# Patient Record
Sex: FEMALE | Race: WHITE | Employment: FULL TIME | URBAN - METROPOLITAN AREA
[De-identification: names, ages, dates, MRNs, and addresses within clinical notes are randomized per-mention and may not be internally consistent; named-entity substitution may affect disease eponyms.]

---

## 2023-10-22 ENCOUNTER — LAB REQUISITION (OUTPATIENT)
Dept: LAB | Facility: HOSPITAL | Age: 28
End: 2023-10-22
Payer: MEDICAID

## 2023-10-22 DIAGNOSIS — R39.9 UNSPECIFIED SYMPTOMS AND SIGNS INVOLVING THE GENITOURINARY SYSTEM: ICD-10-CM

## 2023-10-22 PROCEDURE — 87186 SC STD MICRODIL/AGAR DIL: CPT

## 2023-10-22 PROCEDURE — 87086 URINE CULTURE/COLONY COUNT: CPT

## 2023-10-24 LAB — BACTERIA UR CULT: ABNORMAL

## 2023-11-03 ENCOUNTER — LAB REQUISITION (OUTPATIENT)
Dept: LAB | Facility: HOSPITAL | Age: 28
End: 2023-11-03
Payer: MEDICAID

## 2023-11-03 ENCOUNTER — LAB REQUISITION (OUTPATIENT)
Dept: LAB | Facility: HOSPITAL | Age: 28
End: 2023-11-03

## 2023-11-03 DIAGNOSIS — R10.0 ACUTE ABDOMEN: ICD-10-CM

## 2023-11-03 PROCEDURE — 87661 TRICHOMONAS VAGINALIS AMPLIF: CPT

## 2023-11-03 PROCEDURE — 87086 URINE CULTURE/COLONY COUNT: CPT

## 2023-11-03 PROCEDURE — 87800 DETECT AGNT MULT DNA DIREC: CPT

## 2023-11-05 LAB
BACTERIA UR CULT: NO GROWTH
T VAGINALIS RRNA SPEC QL NAA+PROBE: NEGATIVE

## 2023-11-06 LAB
C TRACH RRNA SPEC QL NAA+PROBE: NEGATIVE
N GONORRHOEA DNA SPEC QL PROBE+SIG AMP: NEGATIVE

## 2024-04-10 ENCOUNTER — OFFICE VISIT (OUTPATIENT)
Dept: URGENT CARE | Facility: CLINIC | Age: 29
End: 2024-04-10
Payer: MEDICAID

## 2024-04-10 VITALS
SYSTOLIC BLOOD PRESSURE: 124 MMHG | DIASTOLIC BLOOD PRESSURE: 86 MMHG | BODY MASS INDEX: 16.2 KG/M2 | TEMPERATURE: 97.6 F | WEIGHT: 103.2 LBS | HEIGHT: 67 IN | OXYGEN SATURATION: 98 % | HEART RATE: 66 BPM

## 2024-04-10 DIAGNOSIS — H10.33 ACUTE CONJUNCTIVITIS OF BOTH EYES, UNSPECIFIED ACUTE CONJUNCTIVITIS TYPE: Primary | ICD-10-CM

## 2024-04-10 PROCEDURE — 99203 OFFICE O/P NEW LOW 30 MIN: CPT

## 2024-04-10 RX ORDER — POLYMYXIN B SULFATE AND TRIMETHOPRIM 1; 10000 MG/ML; [USP'U]/ML
1 SOLUTION OPHTHALMIC EVERY 4 HOURS
Qty: 10 ML | Refills: 0 | Status: SHIPPED | OUTPATIENT
Start: 2024-04-10 | End: 2024-04-20

## 2024-04-10 RX ORDER — DICYCLOMINE HYDROCHLORIDE 10 MG/1
10 CAPSULE ORAL EVERY 8 HOURS
COMMUNITY
Start: 2023-11-04

## 2024-04-10 ASSESSMENT — ENCOUNTER SYMPTOMS
PHOTOPHOBIA: 1
CONSTITUTIONAL NEGATIVE: 1
FATIGUE: 0
SORE THROAT: 0
ABDOMINAL PAIN: 0
CHILLS: 0
MYALGIAS: 0
VOMITING: 0
DIZZINESS: 0
DIAPHORESIS: 0
MUSCULOSKELETAL NEGATIVE: 1
GASTROINTESTINAL NEGATIVE: 1
RESPIRATORY NEGATIVE: 1
DIARRHEA: 0
EYE ITCHING: 0
PALPITATIONS: 0
LOSS OF CONSCIOUSNESS: 0
EYE DISCHARGE: 1
COUGH: 0
NAUSEA: 0
ARTHRALGIAS: 0
CARDIOVASCULAR NEGATIVE: 1
EYE REDNESS: 1
HEADACHES: 1
FEVER: 0
SHORTNESS OF BREATH: 0
RHINORRHEA: 1

## 2024-04-10 ASSESSMENT — VISUAL ACUITY: OU: 1

## 2024-04-10 NOTE — PATIENT INSTRUCTIONS
CONJUNCTIVITIS       - POLYMIXIN B- TRIMETHOPRIM eye drops have been prescribed for 5 days, may reduce from 4x daily to 2x daily after redness/irritation improves (usually after 2 days)       - OLOPATADINE eye drops may be used for allergic conjunctivitis (itchiness and irritation), this may also be purchased over the counter    - Advised to avoid contact with others until 24 hours after prescription started     - If seasonal allergies were a contributing factor to eye irritation, take antihistamine (Zyrtec/Xyzal/Claritin/Allegra) 1 pill once a day, preferably at night as may cause mild drowsiness    - Avoid rubbing the eyes. If itching is bothersome, use artificial tears, a cool compress, or antihistamine eye drops     - If you wear contact lenses, be sure to dispose of current lenses and start fresh after treatment, wear glasses while treating eye infection    - If you have been wearing eye makeup, be sure to dispose of any recently used products as they are contaminated    - Wash hands frequently and avoid sharing towels, bedsheets, or other personal products with others     - Follow-up with eye doctor ASAP if symptoms don't improve or get worse in the next 3 days, if you do not have an eye doctor you can try:         [address] [address] [phone] or          [facility] [place] [address] [phone]

## 2024-04-10 NOTE — PROGRESS NOTES
"Subjective     Krystal Matos is a 28 y.o. female who presents for Conjunctivitis.    Patient presents today with bilateral eye redness and discharge for the last 3 days. She reports believing it may be allergies, but she was around people who recently had pink eye over the weekend. Has been having some congestion and has tried allergy medication and cold compresses the last couple nights. Denies eye itchiness/pain or use of contact lenses      History provided by:  Patient and medical records  Conjunctivitis  Associated symptoms: congestion, headaches and rhinorrhea    Associated symptoms: no abdominal pain, no chest pain, no cough, no diarrhea, no ear pain, no fatigue, no fever, no loss of consciousness, no myalgias, no nausea, no rash, no shortness of breath, no sore throat and no vomiting        /86 (BP Location: Left arm, Patient Position: Sitting, BP Cuff Size: Child)   Pulse 66   Temp 36.4 °C (97.6 °F) (Oral)   Ht 1.702 m (5' 7\")   Wt 46.8 kg (103 lb 3.2 oz)   SpO2 98%   BMI 16.16 kg/m²    All vitals have been reviewed and are stable.    Review of Systems   Constitutional: Negative.  Negative for chills, diaphoresis, fatigue and fever.   HENT:  Positive for congestion and rhinorrhea. Negative for ear pain, sneezing and sore throat.    Eyes:  Positive for photophobia, discharge and redness. Negative for itching and visual disturbance.   Respiratory: Negative.  Negative for cough and shortness of breath.    Cardiovascular: Negative.  Negative for chest pain and palpitations.   Gastrointestinal: Negative.  Negative for abdominal pain, diarrhea, nausea and vomiting.   Musculoskeletal: Negative.  Negative for arthralgias and myalgias.   Skin: Negative.  Negative for rash.   Allergic/Immunologic: Positive for environmental allergies.   Neurological:  Positive for headaches. Negative for dizziness and loss of consciousness.       Objective   Physical Exam  Vitals and nursing note reviewed. "   Constitutional:       General: She is awake. She is not in acute distress.     Appearance: Normal appearance. She is well-developed.   HENT:      Head: Normocephalic and atraumatic.      Nose: Nose normal.      Mouth/Throat:      Lips: Pink.      Mouth: Mucous membranes are moist.      Pharynx: Oropharynx is clear.   Eyes:      General: Lids are normal. Lids are everted, no foreign bodies appreciated. Vision grossly intact. Gaze aligned appropriately. No allergic shiner.        Right eye: Discharge present.         Left eye: Discharge present.     Extraocular Movements: Extraocular movements intact.      Conjunctiva/sclera:      Right eye: Right conjunctiva is injected.      Left eye: Left conjunctiva is injected.      Pupils: Pupils are equal, round, and reactive to light.   Cardiovascular:      Rate and Rhythm: Normal rate and regular rhythm.   Pulmonary:      Effort: Pulmonary effort is normal. No respiratory distress.   Abdominal:      General: Abdomen is flat. There is no distension.   Musculoskeletal:         General: No swelling or deformity. Normal range of motion.      Cervical back: Normal range of motion.   Skin:     General: Skin is warm and dry.   Neurological:      General: No focal deficit present.      Mental Status: She is alert and oriented to person, place, and time. Mental status is at baseline.      Motor: Motor function is intact.      Coordination: Coordination is intact.   Psychiatric:         Mood and Affect: Mood and affect normal.         Speech: Speech normal.         Behavior: Behavior normal.         Thought Content: Thought content normal.         Judgment: Judgment normal.         Assessment/Plan   Problem List Items Addressed This Visit    None  Visit Diagnoses       Acute conjunctivitis of both eyes, unspecified acute conjunctivitis type    -  Primary    Relevant Medications    polymyxin B sulf-trimethoprim (Polytrim) ophthalmic solution            Red flags for reporting to ER  have been reviewed with the patient.    Current diagnosis, any medication changes, and all in-office lab or radiologic results have been reviewed with the patient at the time of the visit.   If symptoms do not improve or worsen, patient is to follow up with PCP or report to the emergency room.   Patient is alert and oriented x3 and non-toxic appearing. Vital signs are stable.   Patient and/or guardian has sufficient decision-making capabilities at this time and reports understanding and agreement with the treatment plan made through shared decision-making.

## 2024-06-07 ENCOUNTER — OFFICE VISIT (OUTPATIENT)
Dept: URGENT CARE | Facility: CLINIC | Age: 29
End: 2024-06-07
Payer: MEDICAID

## 2024-06-07 VITALS
OXYGEN SATURATION: 98 % | TEMPERATURE: 98.8 F | DIASTOLIC BLOOD PRESSURE: 74 MMHG | HEART RATE: 68 BPM | BODY MASS INDEX: 16 KG/M2 | HEIGHT: 68 IN | SYSTOLIC BLOOD PRESSURE: 116 MMHG | WEIGHT: 105.6 LBS

## 2024-06-07 DIAGNOSIS — B37.9 ANTIBIOTIC-INDUCED YEAST INFECTION: ICD-10-CM

## 2024-06-07 DIAGNOSIS — R82.998 LEUKOCYTES IN URINE: ICD-10-CM

## 2024-06-07 DIAGNOSIS — T36.95XA ANTIBIOTIC-INDUCED YEAST INFECTION: ICD-10-CM

## 2024-06-07 DIAGNOSIS — Z11.3 SCREENING FOR STDS (SEXUALLY TRANSMITTED DISEASES): ICD-10-CM

## 2024-06-07 DIAGNOSIS — N89.8 VAGINAL DISCHARGE: ICD-10-CM

## 2024-06-07 DIAGNOSIS — R30.0 DYSURIA: Primary | ICD-10-CM

## 2024-06-07 PROBLEM — R10.0 ACUTE ABDOMEN: Status: RESOLVED | Noted: 2023-11-03 | Resolved: 2024-06-07

## 2024-06-07 PROBLEM — H10.33 ACUTE CONJUNCTIVITIS OF BOTH EYES: Status: RESOLVED | Noted: 2024-06-07 | Resolved: 2024-06-07

## 2024-06-07 PROBLEM — S01.25XA: Status: RESOLVED | Noted: 2017-12-25 | Resolved: 2024-06-07
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
## 2024-06-07 PROBLEM — N39.0 URINARY TRACT INFECTION: Status: RESOLVED | Noted: 2023-10-22 | Resolved: 2024-06-07

## 2024-06-07 LAB
POC APPEARANCE, URINE: ABNORMAL
POC BILIRUBIN, URINE: NEGATIVE
POC BLOOD, URINE: ABNORMAL
POC COLOR, URINE: YELLOW
POC GLUCOSE, URINE: NEGATIVE MG/DL
POC KETONES, URINE: ABNORMAL MG/DL
POC LEUKOCYTES, URINE: ABNORMAL
POC NITRITE,URINE: NEGATIVE
POC PH, URINE: 6.5 PH
POC PROTEIN, URINE: NEGATIVE MG/DL
POC SPECIFIC GRAVITY, URINE: 1.02
POC UROBILINOGEN, URINE: 0.2 EU/DL

## 2024-06-07 PROCEDURE — 87086 URINE CULTURE/COLONY COUNT: CPT

## 2024-06-07 PROCEDURE — 1036F TOBACCO NON-USER: CPT

## 2024-06-07 PROCEDURE — 81003 URINALYSIS AUTO W/O SCOPE: CPT | Mod: CLIA WAIVED TEST

## 2024-06-07 PROCEDURE — 87591 N.GONORRHOEAE DNA AMP PROB: CPT

## 2024-06-07 PROCEDURE — 87661 TRICHOMONAS VAGINALIS AMPLIF: CPT

## 2024-06-07 PROCEDURE — 87491 CHLMYD TRACH DNA AMP PROBE: CPT

## 2024-06-07 PROCEDURE — 99214 OFFICE O/P EST MOD 30 MIN: CPT

## 2024-06-07 RX ORDER — L. ACIDOPHILUS/L.BULGARICUS 1MM CELL
1 TABLET ORAL
Qty: 30 TABLET | Refills: 0 | Status: SHIPPED | OUTPATIENT
Start: 2024-06-07 | End: 2024-07-07

## 2024-06-07 RX ORDER — CEPHALEXIN 500 MG/1
CAPSULE ORAL
COMMUNITY
Start: 2023-10-22

## 2024-06-07 RX ORDER — METRONIDAZOLE 500 MG/1
500 TABLET ORAL 2 TIMES DAILY
Qty: 14 TABLET | Refills: 0 | Status: SHIPPED | OUTPATIENT
Start: 2024-06-07 | End: 2024-06-14

## 2024-06-07 RX ORDER — SULFAMETHOXAZOLE AND TRIMETHOPRIM 800; 160 MG/1; MG/1
TABLET ORAL
COMMUNITY
Start: 2023-10-30

## 2024-06-07 RX ORDER — NITROFURANTOIN 25; 75 MG/1; MG/1
100 CAPSULE ORAL 2 TIMES DAILY
Qty: 14 CAPSULE | Refills: 0 | Status: SHIPPED | OUTPATIENT
Start: 2024-06-07 | End: 2024-06-14

## 2024-06-07 RX ORDER — FLUCONAZOLE 150 MG/1
TABLET ORAL
Qty: 2 TABLET | Refills: 0 | Status: SHIPPED | OUTPATIENT
Start: 2024-06-07

## 2024-06-07 ASSESSMENT — ENCOUNTER SYMPTOMS
CARDIOVASCULAR NEGATIVE: 1
CONSTITUTIONAL NEGATIVE: 1
VOMITING: 0
SHORTNESS OF BREATH: 0
ARTHRALGIAS: 0
DIARRHEA: 0
HEADACHES: 0
FLANK PAIN: 0
HEMATURIA: 0
CHILLS: 0
COUGH: 0
WEAKNESS: 0
NAUSEA: 0
GASTROINTESTINAL NEGATIVE: 1
DIZZINESS: 0
DYSURIA: 1
FATIGUE: 0
FEVER: 0
FREQUENCY: 1
DIAPHORESIS: 0
PALPITATIONS: 0
RESPIRATORY NEGATIVE: 1
NEUROLOGICAL NEGATIVE: 1
MYALGIAS: 0
MUSCULOSKELETAL NEGATIVE: 1
ABDOMINAL PAIN: 0

## 2024-06-07 NOTE — PATIENT INSTRUCTIONS
DYSURIA      - Urinalysis was performed in office with findings of leukocytes and trace blood, indicative of a UTI - NITROFURANTOIN x 7 days was prescribed for treatment of UTI     - Urine culture was sent for further analysis and bacterial sensitivity to antibiotics     - Phenazopyridine (Azo) may be used for no longer than 2 days to relieve symptoms like urinary frequency and burning -- this will temporarily stain urine dark orange-red   (it will also stain contact lens, so do not wear while taking)     - Drink plenty of water   - Cranberry juice (no added sugar- not cocktail), probiotics, and vitamin C have not been scientifically proven to be beneficial in UTIs, but will not cause harm   - Avoid sugary foods and beverages   - Proper hygiene is most important in preventing UTIs (women should always wipe front to back and urinate soon after intercourse)     - SEEK IMMEDIATE MEDICAL CARE IF: you experience fever, back pain, rash, nausea, vomiting, or confusion  If symptoms worsen or do not improve in 2-4 days, follow up with PCP, if you need a referral, please call our office.     VAGINITIS     - Testing was performed for trichomonas, gonorrhea, and chlamydia   - Results will be delivered by phone in 2-4 days   - METRONIDAZOLE x7 days was prescribed for treatment of suspected BV infection - may start this right away, or wait until further along in UTI treatment      - FLUCONAZOLE was prescribed to be taken if yeast infection symptoms develop after multiple antibiotics     - Any further treatments will be determined when results return     - Chemical irritants (soaps, lotions, powders, sprays, scented panty liners) and allergens (latex, seminal fluid, chemical preservatives) can increase local sensitivity and risk of infection   - If symptoms worsen or do not improve in 3-6 days, follow up with PCP, if you need a referral, please call our office.

## 2024-06-07 NOTE — PROGRESS NOTES
"Subjective   History  Krystal Matos is a 28 y.o. female who presents for UTI.    Patient presents with urinary frequency, burning, and lower abdominal pain/cramping for the last 4-5 days.  She reports She would also like to have STI screening because of a new partner. She states that she had a yeast infection before her period that she treated with a one day suppository. She reports her period ended about 6 days ago and she has noticed thick yellow vaginal discharge with an abnormal fishy odor as well, but not any itchiness.       History provided by:  Patient and medical records   used: No    UTI  Associated symptoms: no abdominal pain, no chest pain, no cough, no diarrhea, no fatigue, no fever, no headaches, no myalgias, no nausea, no rash, no shortness of breath and no vomiting        No past surgical history on file.  Social History     Tobacco Use    Smoking status: Never    Smokeless tobacco: Never   Substance Use Topics    Alcohol use: Not on file    Drug use: Not on file       Review of Systems   Constitutional: Negative.  Negative for chills, diaphoresis, fatigue and fever.   Respiratory: Negative.  Negative for cough and shortness of breath.    Cardiovascular: Negative.  Negative for chest pain and palpitations.   Gastrointestinal: Negative.  Negative for abdominal pain, diarrhea, nausea and vomiting.   Genitourinary:  Positive for dysuria, frequency, pelvic pain and vaginal discharge. Negative for flank pain, genital sores, hematuria, menstrual problem, urgency, vaginal bleeding and vaginal pain.   Musculoskeletal: Negative.  Negative for arthralgias and myalgias.   Skin: Negative.  Negative for rash.   Neurological: Negative.  Negative for dizziness, weakness and headaches.       Objective   Vital Signs  /74 (BP Location: Left arm, Patient Position: Sitting, BP Cuff Size: Small adult)   Pulse 68   Temp 37.1 °C (98.8 °F) (Oral)   Ht 1.727 m (5' 8\")   Wt 47.9 kg (105 lb 9.6 " oz)   LMP 05/30/2024 (Exact Date)   SpO2 98%   BMI 16.06 kg/m²    All vitals have been reviewed and are stable.    Diagnostic Results    Recent Results (from the past 12 hour(s))   POCT UA Automated manually resulted    Collection Time: 06/07/24  3:55 PM   Result Value Ref Range    POC Color, Urine Yellow Straw, Yellow, Light-Yellow    POC Appearance, Urine Cloudy (A) Clear    POC Glucose, Urine NEGATIVE NEGATIVE mg/dl    POC Bilirubin, Urine NEGATIVE NEGATIVE    POC Ketones, Urine 15 (1+) (A) NEGATIVE mg/dl    POC Specific Gravity, Urine 1.020 1.005 - 1.035    POC Blood, Urine TRACE-Intact (A) NEGATIVE    POC PH, Urine 6.5 No Reference Range Established PH    POC Protein, Urine NEGATIVE NEGATIVE, 30 (1+) mg/dl    POC Urobilinogen, Urine 0.2 0.2, 1.0 EU/DL    Poc Nitrite, Urine NEGATIVE NEGATIVE    POC Leukocytes, Urine SMALL (1+) (A) NEGATIVE        Physical Exam  Physical Exam  Vitals and nursing note reviewed.   Constitutional:       General: She is awake. She is not in acute distress.     Appearance: Normal appearance. She is well-developed. She is not ill-appearing.   HENT:      Head: Normocephalic and atraumatic.      Right Ear: External ear normal.      Left Ear: External ear normal.      Nose: Nose normal.      Mouth/Throat:      Mouth: Mucous membranes are moist.   Eyes:      Extraocular Movements: Extraocular movements intact.      Conjunctiva/sclera: Conjunctivae normal.      Pupils: Pupils are equal, round, and reactive to light.   Cardiovascular:      Rate and Rhythm: Normal rate and regular rhythm.   Pulmonary:      Effort: Pulmonary effort is normal. No respiratory distress.      Breath sounds: No stridor.   Abdominal:      General: Abdomen is flat.      Palpations: Abdomen is soft.      Tenderness: There is abdominal tenderness in the suprapubic area. There is no right CVA tenderness or left CVA tenderness.   Musculoskeletal:         General: No swelling or deformity. Normal range of motion.       Cervical back: Normal range of motion and neck supple.   Skin:     General: Skin is warm and dry.      Findings: No rash.   Neurological:      General: No focal deficit present.      Mental Status: She is alert and oriented to person, place, and time. Mental status is at baseline.      Motor: Motor function is intact.      Coordination: Coordination is intact.   Psychiatric:         Mood and Affect: Mood and affect normal.         Speech: Speech normal.         Behavior: Behavior normal.         Assessment/Plan     Problem List Items Addressed This Visit    None  Visit Diagnoses       Dysuria    -  Primary    Relevant Medications    nitrofurantoin, macrocrystal-monohydrate, (Macrobid) 100 mg capsule    Other Relevant Orders    Urine Culture    POCT UA Automated manually resulted (Completed)    Screening for STDs (sexually transmitted diseases)        Relevant Orders    Trichomonas vaginalis, Nucleic Acid Detection    C. Trachomatis / N. Gonorrhoeae, Amplified Detection    Antibiotic-induced yeast infection        Relevant Medications    fluconazole (Diflucan) 150 mg tablet    lactobacillus acidophilus (Lactobacillus acidoph-L.bulgar) tablet tablet    Leukocytes in urine        Relevant Medications    nitrofurantoin, macrocrystal-monohydrate, (Macrobid) 100 mg capsule    Vaginal discharge        Relevant Medications    metroNIDAZOLE (Flagyl) 500 mg tablet    lactobacillus acidophilus (Lactobacillus acidoph-L.bulgar) tablet tablet            UC Course  MDM    Patient disposition: Home    Red flags for reporting to ER have been reviewed with the patient.    Current diagnosis, any medication changes, and all in-office lab or radiologic results have been reviewed with the patient at the time of the visit.   If symptoms do not improve or worsen, patient is to follow up with PCP or report to the emergency room.   Patient is alert and oriented x3 and non-toxic appearing. Vital signs are stable.   Patient and/or guardian has  sufficient decision-making capabilities at this time and reports understanding and agreement with the treatment plan made through shared decision-making.

## 2024-06-08 LAB
C TRACH RRNA SPEC QL NAA+PROBE: NEGATIVE
N GONORRHOEA DNA SPEC QL PROBE+SIG AMP: NEGATIVE
T VAGINALIS RRNA SPEC QL NAA+PROBE: NEGATIVE

## 2024-06-09 LAB — BACTERIA UR CULT: NO GROWTH
